# Patient Record
Sex: MALE | URBAN - METROPOLITAN AREA
[De-identification: names, ages, dates, MRNs, and addresses within clinical notes are randomized per-mention and may not be internally consistent; named-entity substitution may affect disease eponyms.]

---

## 2024-09-23 ENCOUNTER — TELEPHONE (OUTPATIENT)
Dept: PAIN MEDICINE | Facility: CLINIC | Age: 49
End: 2024-09-23
Payer: MEDICAID

## 2024-09-23 NOTE — TELEPHONE ENCOUNTER
----- Message from Annabelle Marie sent at 9/23/2024  8:45 AM CDT -----  Contact: [phone] Pt  [email or web address]     Patient is calling for Medical Advice regarding:    Patient wants a call back or [facility]:Call back     Comments:Pt would like a call back due to appt.    Please advise patient replies from provider may take up to 48 hours.

## 2024-09-23 NOTE — TELEPHONE ENCOUNTER
Reached out to patient to schedule appointment . Inform patients that the departments access is limited at this time for any new Medicaid  Patients. I gave pt the Centralized Scheduling number. Which is [phone] or [phone].. Pt understood and was compliant.